# Patient Record
Sex: MALE | Employment: FULL TIME | URBAN - METROPOLITAN AREA
[De-identification: names, ages, dates, MRNs, and addresses within clinical notes are randomized per-mention and may not be internally consistent; named-entity substitution may affect disease eponyms.]

---

## 2021-03-21 ENCOUNTER — HOSPITAL ENCOUNTER (EMERGENCY)
Facility: CLINIC | Age: 31
Discharge: HOME | End: 2021-03-22
Attending: EMERGENCY MEDICINE | Admitting: EMERGENCY MEDICINE

## 2021-03-21 DIAGNOSIS — G51.0 BELL'S PALSY: ICD-10-CM

## 2021-03-21 LAB
ANION GAP SERPL CALCULATED.3IONS-SCNC: 12 MMOL/L (ref 3–14)
BASOPHILS # BLD AUTO: 0 10E9/L (ref 0–0.2)
BASOPHILS NFR BLD AUTO: 0.6 %
BUN SERPL-MCNC: 18 MG/DL (ref 7–30)
CALCIUM SERPL-MCNC: 9.6 MG/DL (ref 8.5–10.1)
CHLORIDE SERPL-SCNC: 107 MMOL/L (ref 94–109)
CO2 SERPL-SCNC: 27 MMOL/L (ref 20–32)
CREAT SERPL-MCNC: 0.93 MG/DL (ref 0.66–1.25)
DIFFERENTIAL METHOD BLD: NORMAL
EOSINOPHIL # BLD AUTO: 0.1 10E9/L (ref 0–0.7)
EOSINOPHIL NFR BLD AUTO: 1.7 %
ERYTHROCYTE [DISTWIDTH] IN BLOOD BY AUTOMATED COUNT: 13.4 % (ref 10–15)
GFR SERPL CREATININE-BSD FRML MDRD: >90 ML/MIN/{1.73_M2}
GLUCOSE BLDC GLUCOMTR-MCNC: 90 MG/DL (ref 70–99)
GLUCOSE SERPL-MCNC: 97 MG/DL (ref 70–99)
HCT VFR BLD AUTO: 48.6 % (ref 40–53)
HGB BLD-MCNC: 16 G/DL (ref 13.3–17.7)
IMM GRANULOCYTES # BLD: 0 10E9/L (ref 0–0.4)
IMM GRANULOCYTES NFR BLD: 0.4 %
LYMPHOCYTES # BLD AUTO: 2.3 10E9/L (ref 0.8–5.3)
LYMPHOCYTES NFR BLD AUTO: 32 %
MCH RBC QN AUTO: 28.6 PG (ref 26.5–33)
MCHC RBC AUTO-ENTMCNC: 32.9 G/DL (ref 31.5–36.5)
MCV RBC AUTO: 87 FL (ref 78–100)
MONOCYTES # BLD AUTO: 0.5 10E9/L (ref 0–1.3)
MONOCYTES NFR BLD AUTO: 6.5 %
NEUTROPHILS # BLD AUTO: 4.3 10E9/L (ref 1.6–8.3)
NEUTROPHILS NFR BLD AUTO: 58.8 %
NRBC # BLD AUTO: 0 10*3/UL
NRBC BLD AUTO-RTO: 0 /100
PLATELET # BLD AUTO: 198 10E9/L (ref 150–450)
POTASSIUM SERPL-SCNC: 3.7 MMOL/L (ref 3.4–5.3)
RBC # BLD AUTO: 5.59 10E12/L (ref 4.4–5.9)
SODIUM SERPL-SCNC: 146 MMOL/L (ref 133–144)
WBC # BLD AUTO: 7.2 10E9/L (ref 4–11)

## 2021-03-21 PROCEDURE — 85025 COMPLETE CBC W/AUTO DIFF WBC: CPT | Performed by: EMERGENCY MEDICINE

## 2021-03-21 PROCEDURE — 999N001017 HC STATISTIC GLUCOSE BY METER IP

## 2021-03-21 PROCEDURE — 86618 LYME DISEASE ANTIBODY: CPT | Performed by: EMERGENCY MEDICINE

## 2021-03-21 PROCEDURE — 80048 BASIC METABOLIC PNL TOTAL CA: CPT | Performed by: EMERGENCY MEDICINE

## 2021-03-21 PROCEDURE — 99283 EMERGENCY DEPT VISIT LOW MDM: CPT

## 2021-03-22 VITALS
RESPIRATION RATE: 18 BRPM | SYSTOLIC BLOOD PRESSURE: 128 MMHG | TEMPERATURE: 98.4 F | OXYGEN SATURATION: 99 % | HEART RATE: 73 BPM | DIASTOLIC BLOOD PRESSURE: 102 MMHG

## 2021-03-22 LAB — B BURGDOR IGG+IGM SER QL: 0.07 (ref 0–0.89)

## 2021-03-22 PROCEDURE — 250N000013 HC RX MED GY IP 250 OP 250 PS 637: Performed by: EMERGENCY MEDICINE

## 2021-03-22 PROCEDURE — 250N000012 HC RX MED GY IP 250 OP 636 PS 637: Performed by: EMERGENCY MEDICINE

## 2021-03-22 RX ORDER — VALACYCLOVIR HYDROCHLORIDE 1 G/1
1000 TABLET, FILM COATED ORAL ONCE
Status: COMPLETED | OUTPATIENT
Start: 2021-03-22 | End: 2021-03-22

## 2021-03-22 RX ORDER — PREDNISONE 20 MG/1
60 TABLET ORAL DAILY
Qty: 18 TABLET | Refills: 0 | Status: SHIPPED | OUTPATIENT
Start: 2021-03-22 | End: 2021-03-28

## 2021-03-22 RX ORDER — PREDNISONE 20 MG/1
60 TABLET ORAL ONCE
Status: COMPLETED | OUTPATIENT
Start: 2021-03-22 | End: 2021-03-22

## 2021-03-22 RX ORDER — VALACYCLOVIR HYDROCHLORIDE 1 G/1
1000 TABLET, FILM COATED ORAL 3 TIMES DAILY
Qty: 21 TABLET | Refills: 0 | Status: SHIPPED | OUTPATIENT
Start: 2021-03-22 | End: 2021-03-29

## 2021-03-22 RX ADMIN — VALACYCLOVIR HYDROCHLORIDE 1000 MG: 1 TABLET, FILM COATED ORAL at 00:29

## 2021-03-22 RX ADMIN — PREDNISONE 60 MG: 20 TABLET ORAL at 00:28

## 2021-03-22 NOTE — ED PROVIDER NOTES
History   Chief Complaint:  Facial Pain       HPI   Jenaro Hadley is a 31 year old male who presents with facial pain. The patient stated that yesterday in the morning he developed facial pain and left-sided paralysis and his face has been painful ever since. Denies headache, pain in arms or legs, cold sores or tick bites.    Review of Systems   HENT:        Left-sided facial pain and numbness    All other systems reviewed and are negative.    Allergies:  The patient has no known allergies.     Medications:  The patient is currently not taking any medications on a regular basis.    Past Medical History:    Elevated cholesterol    Past Surgical History:    The patient denies past surgical history.     Family History:    The patient denies past family history.     Social History:  The patient was brought to the emergency department by private vehicle.  The patient presents to the emergency department alone. No smoking, no drug use    Physical Exam     Patient Vitals for the past 24 hrs:   BP Temp Pulse Resp SpO2   03/21/21 2212 (!) 155/94 98.4  F (36.9  C) 89 16 99 %       Physical Exam     Constitutional:  Oriented to person, place, and time. Well appearing.  HENT:   Head:    Normocephalic. TM clear.  Mouth/Throat:   Oropharynx is clear and moist.   Eyes:    EOM are normal. Pupils are equal, round, and reactive to light.   Neck:    Neck supple.   Cardiovascular:  Normal rate, regular rhythm and normal heart sounds.      Exam reveals no gallop and no friction rub.       No murmur heard.  Pulmonary/Chest:  Effort normal and breath sounds normal.      No respiratory distress. No wheezes. No rales.      No reproducible chest wall pain.  Abdominal:   Soft. No distension. No tenderness. No rebound and no guarding.   Musculoskeletal:  Normal range of motion.   Neurological:   5/5 strength in all 4 extremities.  Sensation intact to light touch in all 4 extremities. He has a left sided moderate facial palsy  with eye and forehead involvement, unable to fully close his left eye.   Skin:    No rash noted. No pallor.       Emergency Department Course     Laboratory:    CBC: WBC 7.2, HGB 16.0,     BMP:  (H) (Creatinine 0.93) o/w WNL     Glucose by meter: 90    Emergency Department Course:    Reviewed:  I reviewed nursing notes and vitals    Assessments/Consults  ED Course as of Mar 22 0001   Mon Mar 22, 2021   0001 I obtained history and examined the patient as noted above.          Interventions:  0029 Valtrex 1,000 mg oral  0028 Prednisone 60 mg oral    Disposition:  The patient was discharged to home.     Impression & Plan         Medical Decision Making:  Jenaro Hadley is a 31 year old male who presents for evaluation of left-sided facial weakness. While the most likely etiology considered was Bell's Palsy, nonetheless a broad differential was considered including CVA (especially brainstem CVA), Lyme disease manifestation, neuropathy associated with HTN, HIV, DM, Vargas-Hunt syndrome, lymphoma, sarcoidosis, brain tumor, etc.  Given the patient's exam including detailed neurologic exam, history and symptoms, age and risk factors, I believe this most likely represents [place] Palsy.  I will initiate steroid therapy. Antivirals will be initiated in this case.  I discussed with the patient normal Bell's Palsy care including eye moisture, taping eye shut at night, etc.  We discussed the natural history of the disease and that not all patients make a full recovery from this.    They will follow up with their doctor, earlier if eye pain develops.  Return here for progressive symptoms as this is unlikely but possible to represent early Guillain-Bent syndrome.  Detailed D/C instructions given.        Diagnosis:    ICD-10-CM    1. Bell's palsy  G51.0        Discharge Medications:  New Prescriptions    ARTIFICIAL TEARS OINT OPHTHALMIC OINTMENT    Place 1 g Into the left eye At Bedtime    PREDNISONE  (DELTASONE) 20 MG TABLET    Take 3 tablets (60 mg) by mouth daily for 6 days    VALACYCLOVIR (VALTREX) 1000 MG TABLET    Take 1 tablet (1,000 mg) by mouth 3 times daily for 7 days       Scribe Disclosure:  Contreras BERGER, am serving as a scribe at 12:01 AM on 3/22/2021 to document services personally performed by George House MD based on my observations and the provider's statements to me.        George House MD  03/22/21 0651

## 2021-03-22 NOTE — RESULT ENCOUNTER NOTE
Final result for Lyme Disease Janneth with reflex to WB Serum is NEGATIVE.    No change in treatment per [facility] Lab Result protocol.

## 2021-03-22 NOTE — ED TRIAGE NOTES
Pt presents for complaint of left sided facial pain and paralysis. Pt states it started yesterday morning and has been painful since. Pt appears to have total paralysis of the left side of the face including his left eyebrow. ABC intact, A&Ox4.